# Patient Record
Sex: FEMALE | Race: WHITE
[De-identification: names, ages, dates, MRNs, and addresses within clinical notes are randomized per-mention and may not be internally consistent; named-entity substitution may affect disease eponyms.]

---

## 2017-02-02 ENCOUNTER — HOSPITAL ENCOUNTER (OUTPATIENT)
Dept: HOSPITAL 59 - HOP | Age: 72
Discharge: HOME | End: 2017-02-02
Attending: INTERNAL MEDICINE
Payer: MEDICARE

## 2017-02-02 DIAGNOSIS — E78.00: ICD-10-CM

## 2017-02-02 DIAGNOSIS — K52.89: Primary | ICD-10-CM

## 2017-02-04 NOTE — OPERATIVE NOTE
DATE OF SURGERY: 



OPERATION: COLONOSCOPY with serial biopsy. 



PREOPERATIVE DIAGNOSIS: History of colitis, rule out dysplasia. 



POSTOPERATIVE DIAGNOSIS: History of colitis, rule out dysplasia. 



PROCEDURE: After informed consent was obtained from the patient, she was placed in the left lateral 
decubitus position in the endoscopy suite, sedated and monitored by the department of anesthesia. 
Digital rectal exam was unremarkable. A well-lubricated XOM406 colonoscope was inserted into the 
rectum and advanced to the cecum. Preparation quality was excellent. The ileocecal valve, cecum, 
appendiceal orifice, ascending colon, transverse colon, and descending colon were unremarkable. The 
sigmoid colon and rectum demonstrated some very mild patchy erythema. Two 10 cm 4-quadrant biopsies 
were obtained and placed in 2 separate jars, one labeled right colon and one labeled left colon. The 
rectum was unremarkable in forward and in J-turn views. The endoscope was straightened, the rectal 
ampulla deflated, and the endoscope was removed. 



RECOMMENDATIONS: I would suggest the patient resume her medications and diet. If no dysplasia found, 
a repeat colonoscopy in 2 years would be suggested. At this point it does not appear that she 
requires any treatment. 



As always, thank you for allowing me to participate in the healthcare of your patients. 





_________________________

Palomo Verduzco DO



CC: HAVEN Livingston

## 2019-04-25 ENCOUNTER — HOSPITAL ENCOUNTER (OUTPATIENT)
Dept: HOSPITAL 59 - HOP | Age: 74
Discharge: HOME | End: 2019-04-25
Attending: INTERNAL MEDICINE
Payer: MEDICARE

## 2019-04-25 DIAGNOSIS — E78.00: ICD-10-CM

## 2019-04-25 DIAGNOSIS — K21.9: ICD-10-CM

## 2019-04-25 DIAGNOSIS — D12.3: ICD-10-CM

## 2019-04-25 DIAGNOSIS — Z09: Primary | ICD-10-CM

## 2019-04-25 DIAGNOSIS — M54.9: ICD-10-CM

## 2019-04-26 NOTE — OPERATIVE NOTE
DATE OF SURGERY: 04/25/2019



OPERATION: COLONOSCOPY with cold snare polypectomy. 



PREOPERATIVE DIAGNOSIS: Questionable history of colitis. 



POSTOPERATIVE DIAGNOSIS: Transverse colon polyp. 



PROCEDURE: After informed consent was obtained from the patient, she was placed in the left lateral 
decubitus position in the endoscopy suite, sedated and monitored by the department of anesthesia. 
Digital rectal exam was unremarkable. A well-lubricated [id number] colonoscope was inserted into the 
rectum and advanced to the cecum. The cecum, cecal bulb, ileocecal valve, and ascending colon were 
unremarkable. In the distal transverse colon there was a 4 mm sessile polyp removed with a cold 
snare and a cold forceps. The remainder of the transverse colon, descending colon, sigmoid colon, 
and rectum were unrevealing. J-turn views of the anorectum were unremarkable. The endoscope was 
straightened, the rectal ampulla deflated, and the endoscope was removed. 



RECOMMENDATIONS: The patient should resume her medications and diet. She should have repeat exam in 
5 years provided her health will allow. 



As always, thank you for allowing me to participate in the healthcare of your patients. CC: HAVEN Livingston